# Patient Record
(demographics unavailable — no encounter records)

---

## 2025-06-25 NOTE — PROCEDURE
[Cerumen Impaction] : Cerumen Impaction [FreeTextEntry6] : Indication: ear plugging and discomfort Large amount cerumen cleared left and right ear instrumentation with curettes, forceps and suction. Ear canals and tympanic membranes  unremarkable. ad canal w pus cleared canal red

## 2025-06-25 NOTE — DATA REVIEWED
[de-identified] :  Type A tymp AD. Type Ad tymp AS. Tymps repeated & confirmed. Testing via inserts & confirmed w/ headphones: AD- WNL up to 2kHz, sloping to a mild to moderate conductive HL. AS- WNL. Recs: 1) F/u w/ MD 2) Re-eval post tx

## 2025-06-25 NOTE — ASSESSMENT
[FreeTextEntry1] : cerumen cleared au rt ot externa-rolalnd powder ciprodex gtts audio mild ad sn loss by hx longstanding chronic eust tube dys

## 2025-06-25 NOTE — REVIEW OF SYSTEMS
[Ear Pain] : ear pain [Hearing Loss] : hearing loss [Recurrent Ear Infections] : recurrent ear infections [Nasal Congestion] : nasal congestion [Sinus Pain] : sinus pain [Sinus Pressure] : sinus pressure [As Noted in HPI] : as noted in HPI [Hoarseness] : hoarseness [Patient Intake Form Reviewed] : Patient intake form was reviewed [Negative] : Heme/Lymph [Ear Itch] : no ear itch [Dizziness] : no dizziness [Vertigo] : no vertigo [Lightheadedness] : no lightheadedness [Ear Drainage] : no ear drainage [Ear Noises] : no ear noises [Problem Snoring] : no snoring problems [Sense Of Smell Problem] : no sense of smell problem [Discolored Nasal Discharge] : no discolored nasal discharge [Snoring With Pauses] : no snoring with pauses [de-identified] : Right ear pressure and pain for 1 week, constant dull pressure in sinus and forehead. Right ear Hearing decrease. Hx b/l ear tubes as child, Right ear drum patched after bursting, deviated septum surgery, sinus cyst removal.  [de-identified] : sinus pain pressure to face and forehead.  [de-identified] : hoarseness to voice [FreeTextEntry1] : Hx b/l ear tubes as child, Right ear drum patched after bursting, deviated septum surgery, sinus cyst removal.

## 2025-06-25 NOTE — HISTORY OF PRESENT ILLNESS
[de-identified] : c/o rt ear plugging past week recent multiple plane flights no uri, neg allergies hx ear issues-vent tubes  3 x as child hx ad tm perf age 17 co intense ear pain w plane flight